# Patient Record
Sex: FEMALE | Race: WHITE | Employment: PART TIME | URBAN - METROPOLITAN AREA
[De-identification: names, ages, dates, MRNs, and addresses within clinical notes are randomized per-mention and may not be internally consistent; named-entity substitution may affect disease eponyms.]

---

## 2017-08-07 ENCOUNTER — OFFICE VISIT (OUTPATIENT)
Dept: INTERNAL MEDICINE CLINIC | Age: 52
End: 2017-08-07

## 2017-08-07 VITALS
SYSTOLIC BLOOD PRESSURE: 110 MMHG | TEMPERATURE: 98.6 F | BODY MASS INDEX: 30.11 KG/M2 | WEIGHT: 163.6 LBS | OXYGEN SATURATION: 98 % | DIASTOLIC BLOOD PRESSURE: 66 MMHG | HEIGHT: 62 IN | HEART RATE: 63 BPM | RESPIRATION RATE: 12 BRPM

## 2017-08-07 DIAGNOSIS — M25.551 ACUTE RIGHT HIP PAIN: Primary | ICD-10-CM

## 2017-08-07 RX ORDER — OMEGA-3S/DHA/EPA/FISH OIL/D3 300MG-1000
CAPSULE ORAL
COMMUNITY
End: 2019-12-19 | Stop reason: ALTCHOICE

## 2017-08-07 RX ORDER — PREDNISONE 20 MG/1
TABLET ORAL
Qty: 23 TAB | Refills: 0 | Status: SHIPPED | OUTPATIENT
Start: 2017-08-07 | End: 2017-09-15 | Stop reason: ALTCHOICE

## 2017-08-07 NOTE — MR AVS SNAPSHOT
Visit Information Date & Time Provider Department Dept. Phone Encounter #  
 8/7/2017  3:00 PM Kathrine Brewer MD Internist of [address] [id number] Upcoming Health Maintenance Date Due  
 PAP AKA CERVICAL CYTOLOGY 7/8/2017 INFLUENZA AGE 9 TO ADULT 8/1/2017 BREAST CANCER SCRN MAMMOGRAM 7/28/2018 COLONOSCOPY 12/5/2019 DTaP/Tdap/Td series (2 - Td) 4/4/2023 Allergies as of 8/7/2017  Review Complete On: 8/7/2017 By: Omar Dee LPN Severity Noted Reaction Type Reactions Penicillin G  10/04/2011    Unknown (comments) Able to take amoxicillin Current Immunizations  Reviewed on 11/3/2015 Name Date Td 1/1/2002 Tdap 4/4/2013 Not reviewed this visit You Were Diagnosed With   
  
 Codes Comments Acute right hip pain    -  Primary ICD-10-CM: M25.551 ICD-9-CM: 719.45 Vitals BP Pulse Temp Resp Height(growth percentile) Weight(growth percentile) 110/66 63 98.6 °F (37 °C) (Oral) 12 5' 2\" (1.575 m) 163 lb 9.6 oz (74.2 kg) SpO2 BMI OB Status Smoking Status 98% 29.92 kg/m2 Menopause Never Smoker Vitals History BMI and BSA Data Body Mass Index Body Surface Area  
 29.92 kg/m 2 1.8 m 2 Preferred Pharm[facility] Name Phone [facility]. Vanita Jason, [address] [facility] [phone] Your Updated Medication List  
  
   
This list is accurate as of: 8/7/17  3:31 PM.  Always use your most recent med list.  
  
  
  
  
 CALCIUM 500 PO Take  by mouth. omeprazole 40 mg capsule Commonly known as:  PriLOSEC Take 1 Cap by mouth daily. predniSONE 20 mg tablet Commonly known as:  DELTASONE  
3 tablets by mouth daily for 3 days, then 2 tabs looks daily VITAMIN D3 2,000 unit Tab Generic drug:  cholecalciferol (vitamin D3) Take  by mouth. Prescriptions Printed  Refills  
 predniSONE (DELTASONE) 20 mg tablet 0  
 Sig: 3 tablets by mouth daily for 3 days, then 2 tabs looks daily Class: Print Introducing [address]! [facility] introduces Voxa patient portal. Now you can access parts of your medical record, email your doctor's office, and request medication refills online. 1. In your internet browser, go to [email or web address] 2. Click on the First Time User? Click Here link in the Sign In box. You will see the New Member Sign Up page. 3. Enter your Voxa Access Code exactly as it appears below. You will not need to use this code after youve completed the sign-up process. If you do not sign up before the expiration date, you must request a new code. · Voxa Access Code: [email or web address] Expires: 11/5/2017  2:45 PM 
 
4. Enter the last four digits of your Social Security Number (xxxx) and Date of Birth (mm/dd/yyyy) as indicated and click Submit. You will be taken to the next sign-up page. 5. Create a Voxa ID. This will be your Voxa login ID and cannot be changed, so think of one that is secure and easy to remember. 6. Create a Voxa password. You can change your password at any time. 7. Enter your Password Reset Question and Answer. This can be used at a later time if you forget your password. 8. Enter your e-mail address. You will receive e-mail notification when new information is available in [address] 9. Click Sign Up. You can now view and download portions of your medical record. 10. Click the Download Summary menu link to download a portable copy of your medical information. If you have questions, please visit the Frequently Asked Questions section of the Voxa website. Remember, Voxa is NOT to be used for urgent needs. For medical emergencies, dial 911. Now available from your iPhone and Android! Please provide this summary of care documentation to your next provider. Your primary care clinician is listed as Raquel Pierre. Cherise Recinos. If you have any questions after today's visit, please call [phone].

## 2017-08-07 NOTE — PROGRESS NOTES
Elvis Ricci [facility] 1965, is a 46 y.o. female, who is seen today for right leg and hip pain. About a week ago she noted some vague discomfort in the pretibial region of her right leg but not a lot of pain and then after work 3 days ago she noted much more discomfort in the area of the right inguinal region and yesterday stayed in bed all day even though it hurt and last night she did not sleep well because of pain. It felt better in the leg if she actually rolled over onto her right hip to sleep and standing seems to be a little better than sitting. There is no pain in the back. She has never had anything like this before. She works as a nurse. No past medical history on file. Current Outpatient Prescriptions   Medication Sig Dispense Refill    cholecalciferol, vitamin D3, (VITAMIN D3) 2,000 unit tab Take  by mouth.  CALCIUM CARBONATE (CALCIUM 500 PO) Take  by mouth.  omeprazole (PRILOSEC) 40 mg capsule Take 1 Cap by mouth daily. 90 Cap 3     Allergies   Allergen Reactions    Penicillin G Unknown (comments)     Able to take amoxicillin     Visit Vitals    /66    Pulse 63    Temp 98.6 °F (37 °C) (Oral)    Resp 12    Ht 5' 2\" (1.575 m)    Wt 163 lb 9.6 oz (74.2 kg)    SpO2 98%    BMI 29.92 kg/m2     There is no tenderness in the lower leg or in the upper leg except there is some tenderness in the inguinal region. There is no lower abdominal tenderness. There is some mild tenderness over the lateral right hip. There is no edema in the lower leg. Dorsalis pedis and posterior tibialis pulses are 2+ on the right and left. She has normal straight leg raising. Forward flexion does not cause pain all the way to about 80° of flexion. She does have discomfort with internal and external rotation which she feels in the right inguinal region. There is no tenderness in the back. Deep tendon reflexes are equal bilaterally.     Assessment: Initially some discomfort in the anterior lower right leg which is minimal at this point but she is having a lot of pain in the inguinal region and standing is definitely not worse and perhaps a little better than sitting, but she has significant discomfort with even a small amount of internal and external rotation, suggesting this is coming from the hip and not the connective tissue surrounding the hip. She will be treated with prednisone 60 mg daily for 3 days then 40 mg daily thereafter until the pain is substantially better and if she is not significantly better in 3 days I would send her to an orthopedist.  I did explain to her the possible side effects of short-term prednisone since she does not recall if she has ever been on it and if she has it has been many years. Since she does not have a lot of pain standing and she does have a lot of pain sitting in bed, osteonecrosis does not seem to be the problem. Follow-up with me otherwise as needed and for her routine annual physicals. Emeterio Mcdaniel Se, MD FACP    Please note: This document has been produced using voice recognition software. Unrecognized errors in transcription may be present.

## 2017-09-15 ENCOUNTER — OFFICE VISIT (OUTPATIENT)
Dept: INTERNAL MEDICINE CLINIC | Age: 52
End: 2017-09-15

## 2017-09-15 VITALS
TEMPERATURE: 101.6 F | BODY MASS INDEX: 29.81 KG/M2 | HEART RATE: 109 BPM | WEIGHT: 162 LBS | DIASTOLIC BLOOD PRESSURE: 70 MMHG | SYSTOLIC BLOOD PRESSURE: 116 MMHG | OXYGEN SATURATION: 96 % | HEIGHT: 62 IN | RESPIRATION RATE: 12 BRPM

## 2017-09-15 DIAGNOSIS — J18.9 ATYPICAL PNEUMONIA: Primary | ICD-10-CM

## 2017-09-15 DIAGNOSIS — J02.9 PHARYNGITIS, UNSPECIFIED ETIOLOGY: ICD-10-CM

## 2017-09-15 LAB
S PYO AG THROAT QL: NEGATIVE
VALID INTERNAL CONTROL?: YES

## 2017-09-15 RX ORDER — AZITHROMYCIN 250 MG/1
TABLET, FILM COATED ORAL
Qty: 6 TAB | Refills: 0 | Status: SHIPPED | OUTPATIENT
Start: 2017-09-15 | End: 2017-09-20

## 2017-09-15 NOTE — PROGRESS NOTES
Elvis Ricci [facility] 1965, is a 46 y.o. female, who is seen today for upper respiratory symptoms. 2 days ago she developed a sore throat and then very soon thereafter some nasal congestion which is better with use of Sudafed. She also developed a cough with occasional production of clear foamy sputum. Her chest is sore from coughing day and night. Yesterday she was coughing so much he vomited once. She has had some chills but has not checked her temperature at home. She thinks she has a fever. She has had similar issues in the past with viral respiratory infections but generally not a fever. History reviewed. No pertinent past medical history. Current Outpatient Prescriptions   Medication Sig Dispense Refill    Mv,Ca,Min-FA-Herbal No.157 (ESTROVEN MAXIMUM STRENGTH) 400 mcg tab Take  by mouth.  MV,IRON,MIN/LUTEIN (CENTRUM SILVER ULTRA WOMEN'S PO) Take  by mouth.  cholecalciferol, vitamin D3, (VITAMIN D3) 2,000 unit tab Take  by mouth.  CALCIUM CARBONATE (CALCIUM 500 PO) Take  by mouth.  omeprazole (PRILOSEC) 40 mg capsule Take 1 Cap by mouth daily. 90 Cap 3     Visit Vitals    /70    Pulse (!) 109    Temp (!) 101.6 °F (38.7 °C) (Oral)    Resp 12    Ht 5' 2\" (1.575 m)    Wt 162 lb (73.5 kg)    SpO2 96%    BMI 29.63 kg/m2     Pharynx is very red without exudates. Sinuses are nontender. Neck reveals no adenopathy or tenderness. Lungs are clear to percussion. Good breath sounds with no wheezing or crackles. Heart reveals a regular rhythm with no murmur gallop click or rub. Abdomen is soft and nontender with no hepatosplenomegaly or masses and no bruits. Extremities reveal no clubbing cyanosis or edema. Strep screen is negative. Assessment: Upper respiratory symptoms as above with a lot of coughing and fever, most likely representing atypical pneumonia. I told her viruses other than influenza and sinus infections generally do not cause fever. She will be treated with azithromycin and she will use Robitussin-DM, she prefers not to use Tussionex. She will be going home and going to bed, she will keep up with fluids and call if there is any worsening of symptoms. Follow-up for physical in November or December. Emeterio Mcgrath MD FACP    Please note: This document has been produced using voice recognition software. Unrecognized errors in transcription may be present.

## 2017-09-15 NOTE — MR AVS SNAPSHOT
Visit Information Date & Time Provider Department Dept. Phone Encounter #  
 9/15/2017  8:30 AM Shauna Shay MD Internist of [facility] [address] [id number] Your Appointments 11/16/2017  9:25 AM  
LAB with [facility] NURSE VISIT Internist of [facility] (Anmol Beltran) Appt Note: lab  
 [address], [address] [facility] [address] [place]  
  
   
 [address], [address]  
  
    
 11/22/2017  9:15 AM  
PHYSICAL with Shauna Shay MD  
Internist of Adama Beltran) Appt Note: rpe  
 [address] [facility], [address] [address] [address] [place]  
  
   
 [address], [address] Upcoming Health Maintenance Date Due  
 PAP AKA CERVICAL CYTOLOGY 7/8/2017 INFLUENZA AGE 9 TO ADULT 8/1/2017 BREAST CANCER SCRN MAMMOGRAM 7/28/2018 COLONOSCOPY 12/5/2019 DTaP/Tdap/Td series (2 - Td) 4/4/2023 Allergies as of 9/15/2017  Review Complete On: 9/15/2017 By: Shauna Shay MD  
  
 Severity Noted Reaction Type Reactions Penicillin G  10/04/2011    Unknown (comments) Able to take amoxicillin Current Immunizations  Reviewed on 11/3/2015 Name Date Td 1/1/2002 Tdap 4/4/2013 Not reviewed this visit You Were Diagnosed With   
  
 Codes Comments Atypical pneumonia    -  Primary ICD-10-CM: J18.9 ICD-9-CM: 520 Pharyngitis, unspecified etiology     ICD-10-CM: J02.9 ICD-9-CM: 736 Vitals BP Pulse Temp Resp Height(growth percentile) Weight(growth percentile) 116/70 (!) 109 (!) 101.6 °F (38.7 °C) (Oral) 12 5' 2\" (1.575 m) 162 lb (73.5 kg) SpO2 BMI OB Status Smoking Status 96% 29.63 kg/m2 Menopause Never Smoker BMI and BSA Data Body Mass Index Body Surface Area  
 29.63 kg/m 2 1.79 m 2 [facility]erred Pharm[facility]. Grover Olivas, [address] [facility]. [phone] Your Updated Medication List  
  
   
This list is accurate as of: 9/15/17  9:11 AM.  Always use your most recent med list.  
  
  
  
  
 azithromycin 250 mg tablet Commonly known as:  Luciano Marshal 2 tablets by mouth today, then 1 daily CALCIUM 500 PO Take  by mouth. CENTRUM SILVER ULTRA WOMEN'S PO Take  by mouth. ESTROVEN MAXIMUM STRENGTH 400 mcg Tab Generic drug:  Mv,Ca,Min-FA-Herbal No.157 Take  by mouth. omeprazole 40 mg capsule Commonly known as:  PriLOSEC Take 1 Cap by mouth daily. VITAMIN D3 2,000 unit Tab Generic drug:  cholecalciferol (vitamin D3) Take  by mouth. Prescriptions Printed Refills  
 azithromycin (ZITHROMAX) 250 mg tablet 0 Si tablets by mouth today, then 1 daily Class: Print We Performed the Following AMB POC RAPID STREP A [63134 CPT(R)] Introducing [facility]! Laura Florian introduces NaHere patient portal. Now you can access parts of your medical record, email your doctor's office, and request medication refills online. 1. In your internet browser, go to [email or web address] 2. Click on the First Time User? Click Here link in the Sign In box. You will see the New Member Sign Up page. 3. Enter your NaHere Access Code exactly as it appears below. You will not need to use this code after youve completed the sign-up process. If you do not sign up before the expiration date, you must request a new code. · NaHere Access Code: [email or web address] Expires: 2017  2:45 PM 
 
4. Enter the last four digits of your Social Security Number (xxxx) and Date of Birth (mm/dd/yyyy) as indicated and click Submit. You will be taken to the next sign-up page. 5. Create a NaHere ID. This will be your NaHere login ID and cannot be changed, so think of one that is secure and easy to remember. 6. Create a Sunfun Info password. You can change your password at any time. 7. Enter your Password Reset Question and Answer. This can be used at a later time if you forget your password. 8. Enter your e-mail address. You will receive e-mail notification when new information is available in [address] 9. Click Sign Up. You can now view and download portions of your medical record. 10. Click the Download Summary menu link to download a portable copy of your medical information. If you have questions, please visit the Frequently Asked Questions section of the Sun[employer] Info website. Remember, Sun[employer] Info is NOT to be used for urgent needs. For medical emergencies, dial 911. Now available from your iPhone and Android! Please provide this summary of care documentation to your next provider. Your primary care clinician is listed as Uri Bedolla. Laurie Edwards. If you have any questions after today's visit, please call [phone].

## 2017-11-10 DIAGNOSIS — R63.5 WEIGHT GAIN: ICD-10-CM

## 2017-11-10 DIAGNOSIS — E55.9 VITAMIN D INSUFFICIENCY: ICD-10-CM

## 2017-11-10 DIAGNOSIS — Z00.00 ROUTINE GENERAL MEDICAL EXAMINATION AT A HEALTH CARE FACILITY: ICD-10-CM

## 2017-11-16 ENCOUNTER — HOSPITAL ENCOUNTER (OUTPATIENT)
Dept: LAB | Age: 52
Discharge: HOME | End: 2017-11-16
Payer: COMMERCIAL

## 2017-11-16 LAB
ALBUMIN SERPL-MCNC: 4 G/DL (ref 3.4–5)
ALBUMIN/GLOB SERPL: 1.1 {RATIO} (ref 0.8–1.7)
ALP SERPL-CCNC: 119 U/L (ref 45–117)
ALT SERPL-CCNC: 29 U/L (ref 13–56)
ANION GAP SERPL CALC-SCNC: 8 MMOL/L (ref 3–18)
APPEARANCE UR: CLEAR
AST SERPL-CCNC: 19 U/L (ref 15–37)
BACTERIA URNS QL MICRO: NEGATIVE /HPF
BASOPHILS # BLD: 0 K/UL (ref 0–0.06)
BASOPHILS NFR BLD: 0 % (ref 0–2)
BILIRUB SERPL-MCNC: 0.5 MG/DL (ref 0.2–1)
BILIRUB UR QL: NEGATIVE
BUN SERPL-MCNC: 11 MG/DL (ref 7–18)
BUN/CREAT SERPL: 15 (ref 12–20)
CALCIUM SERPL-MCNC: 9.7 MG/DL (ref 8.5–10.1)
CHLORIDE SERPL-SCNC: 101 MMOL/L (ref 100–108)
CHOLEST SERPL-MCNC: 201 MG/DL
CO2 SERPL-SCNC: 29 MMOL/L (ref 21–32)
COLOR UR: YELLOW
CREAT SERPL-MCNC: 0.72 MG/DL (ref 0.6–1.3)
DIFFERENTIAL METHOD BLD: NORMAL
EOSINOPHIL # BLD: 0 K/UL (ref 0–0.4)
EOSINOPHIL NFR BLD: 0 % (ref 0–5)
EPITH CASTS URNS QL MICRO: NORMAL /LPF (ref 0–5)
ERYTHROCYTE [DISTWIDTH] IN BLOOD BY AUTOMATED COUNT: 14.3 % (ref 11.6–14.5)
GLOBULIN SER CALC-MCNC: 3.6 G/DL (ref 2–4)
GLUCOSE SERPL-MCNC: 99 MG/DL (ref 74–99)
GLUCOSE UR STRIP.AUTO-MCNC: NEGATIVE MG/DL
HCT VFR BLD AUTO: 43.7 % (ref 35–45)
HDLC SERPL-MCNC: 40 MG/DL (ref 40–60)
HDLC SERPL: 5 {RATIO} (ref 0–5)
HGB BLD-MCNC: 14 G/DL (ref 12–16)
HGB UR QL STRIP: NEGATIVE
KETONES UR QL STRIP.AUTO: NEGATIVE MG/DL
LDLC SERPL CALC-MCNC: 131.4 MG/DL (ref 0–100)
LEUKOCYTE ESTERASE UR QL STRIP.AUTO: ABNORMAL
LIPID PROFILE,FLP: ABNORMAL
LYMPHOCYTES # BLD: 2.5 K/UL (ref 0.9–3.6)
LYMPHOCYTES NFR BLD: 36 % (ref 21–52)
MCH RBC QN AUTO: 29.5 PG (ref 24–34)
MCHC RBC AUTO-ENTMCNC: 32 G/DL (ref 31–37)
MCV RBC AUTO: 92 FL (ref 74–97)
MONOCYTES # BLD: 0.5 K/UL (ref 0.05–1.2)
MONOCYTES NFR BLD: 8 % (ref 3–10)
NEUTS SEG # BLD: 3.8 K/UL (ref 1.8–8)
NEUTS SEG NFR BLD: 56 % (ref 40–73)
NITRITE UR QL STRIP.AUTO: NEGATIVE
PH UR STRIP: 7 [PH] (ref 5–8)
PLATELET # BLD AUTO: 375 K/UL (ref 135–420)
PMV BLD AUTO: 11 FL (ref 9.2–11.8)
POTASSIUM SERPL-SCNC: 4.5 MMOL/L (ref 3.5–5.5)
PROT SERPL-MCNC: 7.6 G/DL (ref 6.4–8.2)
PROT UR STRIP-MCNC: NEGATIVE MG/DL
RBC # BLD AUTO: 4.75 M/UL (ref 4.2–5.3)
RBC #/AREA URNS HPF: 0 /HPF (ref 0–5)
SODIUM SERPL-SCNC: 138 MMOL/L (ref 136–145)
SP GR UR REFRACTOMETRY: 1.02 (ref 1–1.03)
T4 FREE SERPL-MCNC: 1 NG/DL (ref 0.7–1.5)
TRIGL SERPL-MCNC: 148 MG/DL (ref ?–150)
UROBILINOGEN UR QL STRIP.AUTO: 0.2 EU/DL (ref 0.2–1)
VLDLC SERPL CALC-MCNC: 29.6 MG/DL
WBC # BLD AUTO: 6.8 K/UL (ref 4.6–13.2)
WBC URNS QL MICRO: NORMAL /HPF (ref 0–4)

## 2017-11-16 PROCEDURE — 85025 COMPLETE CBC W/AUTO DIFF WBC: CPT | Performed by: INTERNAL MEDICINE

## 2017-11-16 PROCEDURE — 81001 URINALYSIS AUTO W/SCOPE: CPT | Performed by: INTERNAL MEDICINE

## 2017-11-16 PROCEDURE — 80061 LIPID PANEL: CPT | Performed by: INTERNAL MEDICINE

## 2017-11-16 PROCEDURE — 84439 ASSAY OF FREE THYROXINE: CPT | Performed by: INTERNAL MEDICINE

## 2017-11-16 PROCEDURE — 82306 VITAMIN D 25 HYDROXY: CPT | Performed by: INTERNAL MEDICINE

## 2017-11-16 PROCEDURE — 36415 COLL VENOUS BLD VENIPUNCTURE: CPT | Performed by: INTERNAL MEDICINE

## 2017-11-16 PROCEDURE — 80053 COMPREHEN METABOLIC PANEL: CPT | Performed by: INTERNAL MEDICINE

## 2017-11-17 LAB — 25(OH)D3 SERPL-MCNC: 37.3 NG/ML (ref 30–100)

## 2017-11-22 ENCOUNTER — OFFICE VISIT (OUTPATIENT)
Dept: INTERNAL MEDICINE CLINIC | Age: 52
End: 2017-11-22

## 2017-11-22 VITALS
HEIGHT: 62 IN | OXYGEN SATURATION: 99 % | SYSTOLIC BLOOD PRESSURE: 118 MMHG | RESPIRATION RATE: 14 BRPM | BODY MASS INDEX: 28.93 KG/M2 | DIASTOLIC BLOOD PRESSURE: 82 MMHG | TEMPERATURE: 98 F | WEIGHT: 157.2 LBS | HEART RATE: 102 BPM

## 2017-11-22 DIAGNOSIS — Z00.00 ROUTINE GENERAL MEDICAL EXAMINATION AT A HEALTH CARE FACILITY: Primary | ICD-10-CM

## 2017-11-22 RX ORDER — OMEPRAZOLE 40 MG/1
40 CAPSULE, DELAYED RELEASE ORAL DAILY
Qty: 90 CAP | Refills: 3 | Status: SHIPPED | OUTPATIENT
Start: 2017-11-22

## 2017-11-22 NOTE — PROGRESS NOTES
1. Have you been to the ER, urgent care clinic or hospitalized since your last visit? NO.     2. Have you seen or consulted any other health care providers outside of the [address] [place] [address] since your last visit (Include any pap smears or colon screening)? [facility] Dr Adrian Kaur She has an appt Dec 1st for eye exam with Dr Glenis [facility]     Do you have an Advanced Directive? NO    Would you like information on Advanced Directives?  YES

## 2017-11-22 NOTE — PATIENT INSTRUCTIONS
Advance Directives: Care Instructions  Your Care Instructions  An advance directive is a legal way to state your wishes at the end of your life. It tells your family and your doctor what to do if you can no longer say what you want. There are two main types of advance directives. You can change them any time that your wishes change. · A living will tells your family and your doctor your wishes about life support and other treatment. · A durable power of  for health care lets you name a person to make treatment decisions for you when you can't speak for yourself. This person is called a health care agent. If you do not have an advance directive, decisions about your medical care may be made by a doctor or a  who doesn't know you. It may help to think of an advance directive as a gift to the people who care for you. If you have one, they won't have to make tough decisions by themselves. Follow-up care is a key part of your treatment and safety. Be sure to make and go to all appointments, and call your doctor if you are having problems. It's also a good idea to know your test results and keep a list of the medicines you take. How can you care for yourself at home? · Discuss your wishes with your loved ones and your doctor. This way, there are no surprises. · Many states have a unique form. Or you might use a universal form that has been approved by many states. This kind of form can sometimes be completed and stored online. Your electronic copy will then be available wherever you have a connection to the Internet. In most cases, doctors will respect your wishes even if you have a form from a different state. · You don't need a  to do an advance directive. But you may want to get legal advice. · Think about these questions when you prepare an advance directive:  ¨ Who do you want to make decisions about your medical care if you are not able to?  Many people choose a family member or close friend. ¨ Do you know enough about life support methods that might be used? If not, talk to your doctor so you understand. ¨ What are you most afraid of that might happen? You might be afraid of having pain, losing your independence, or being kept alive by machines. ¨ Where would you prefer to die? Choices include your home, a hospital, or a nursing home. ¨ Would you like to have information about hospice care to support you and your family? ¨ Do you want to donate organs when you die? ¨ Do you want certain Confucianism practices performed before you die? If so, put your wishes in the advance directive. · Read your advance directive every year, and make changes as needed. When should you call for help? Be sure to contact your doctor if you have any questions. Where can you learn more? Go to http://isaías-ashanti.info/. Enter R264 in the search box to learn more about \"Advance Directives: Care Instructions. \"  Current as of: September 24, 2016  Content Version: 11.4  © 8047-4633 Healthwise, Incorporated. Care instructions adapted under license by [employer] (which disclaims liability or warranty for this information). If you have questions about a medical condition or this instruction, always ask your healthcare professional. [address] any warranty or liability for your use of this information.

## 2017-11-22 NOTE — PROGRESS NOTES
Salvador [facility] 1965, is a 45 y.o. female, who is seen today for routine physical exam.  She feels well. She has cut out breakfast a lot of days and most of her sodium has lost 7 pounds in the last year and she would like to get down to 150 pounds. She has hypovitaminosis D and does use vitamin D every day. She has a history of reflux and is asymptomatic on omeprazole. She has not had a menstrual period in nearly 2 years and she is due to see her gynecologist soon, her last Pap smear was about 2 years ago. She does get mammograms every year. She checks her own breasts. She is a nurse. History reviewed. No pertinent past medical history. Past Surgical History:   Procedure Laterality Date    HX COLONOSCOPY  12/05/2014    Tubular adenoma, 5 years    HX ORTHOPAEDIC      fxd wrist     Current Outpatient Prescriptions   Medication Sig Dispense Refill    omeprazole (PRILOSEC) 40 mg capsule Take 1 Cap by mouth daily. 90 Cap 3    Mv,Ca,Min-FA-Herbal No.157 (ESTROVEN MAXIMUM STRENGTH) 400 mcg tab Take  by mouth.  MV,IRON,MIN/LUTEIN (CENTRUM SILVER ULTRA WOMEN'S PO) Take  by mouth.  cholecalciferol, vitamin D3, (VITAMIN D3) 2,000 unit tab Take  by mouth.  CALCIUM CARBONATE (CALCIUM 500 PO) Take  by mouth.        Allergies   Allergen Reactions    Penicillin G Unknown (comments)     Able to take amoxicillin     Social History     Social History    Marital status:      Spouse name: N/A    Number of children: N/A    Years of education: N/A     Social History Main Topics    Smoking status: Never Smoker    Smokeless tobacco: Never Used    Alcohol use No    Drug use: No    Sexual activity: Yes     Partners: Male     Birth control/ protection: None     Other Topics Concern    None     Social History Narrative     Visit Vitals    /82 (BP 1 Location: Right arm, BP Patient Position: Sitting)    Pulse (!) 102    Temp 98 °F (36.7 °C) (Oral)    Resp 14    Ht 5' 2\" (1.575 m)    Wt 157 lb 3.2 oz (71.3 kg)    SpO2 99%    BMI 28.75 kg/m2     The patient is a well-developed well-nourished female in no apparent distress. HEENT: Pupils are equal and react to light and extraocular movements are full. Ear canals and tympanic membranes appear normal. Oral cavity appears normal with no oral lesions. Neck: Carotids are 2+ without bruits. No adenopathy or thyromegaly. Lungs are clear to percussion. I hear no wheezing, rales or rhonchi. Heart reveals a regular rhythm with no murmur, gallop, click or rub. The apical impulse is in the fifth interspace at the midclavicular line. Abdomen is soft and nontender with no hepatosplenomegaly or masses. Bowel sounds are normoactive and there is no distention or tympany. Extremities reveal no clubbing cyanosis or edema. Pulses are 2+. Skin reveals no suspicious skin growths. Breasts reveal no masses, skin or nipple abnormalities. No axillary adenopathy. Results for orders placed or performed during the hospital encounter of 11/16/17   CBC WITH AUTOMATED DIFF   Result Value Ref Range    WBC 6.8 4.6 - 13.2 K/uL    RBC 4.75 4.20 - 5.30 M/uL    HGB 14.0 12.0 - 16.0 g/dL    HCT 43.7 35.0 - 45.0 %    MCV 92.0 74.0 - 97.0 FL    MCH 29.5 24.0 - 34.0 PG    MCHC 32.0 31.0 - 37.0 g/dL    RDW 14.3 11.6 - 14.5 %    PLATELET 016 176 - 740 K/uL    MPV 11.0 9.2 - 11.8 FL    NEUTROPHILS 56 40 - 73 %    LYMPHOCYTES 36 21 - 52 %    MONOCYTES 8 3 - 10 %    EOSINOPHILS 0 0 - 5 %    BASOPHILS 0 0 - 2 %    ABS. NEUTROPHILS 3.8 1.8 - 8.0 K/UL    ABS. LYMPHOCYTES 2.5 0.9 - 3.6 K/UL    ABS. MONOCYTES 0.5 0.05 - 1.2 K/UL    ABS. EOSINOPHILS 0.0 0.0 - 0.4 K/UL    ABS.  BASOPHILS 0.0 0.0 - 0.06 K/UL    DF AUTOMATED     T4, FREE   Result Value Ref Range    T4, Free 1.0 0.7 - 1.5 NG/DL   LIPID PANEL   Result Value Ref Range    LIPID PROFILE          Cholesterol, total 201 (H) <200 MG/DL    Triglyceride 148 <150 MG/DL    HDL Cholesterol 40 40 - 60 MG/DL    LDL, calculated 131.4 (H) 0 - 100 MG/DL    VLDL, calculated 29.6 MG/DL    CHOL/HDL Ratio 5.0 0 - 5.0     METABOLIC PANEL, COMPREHENSIVE   Result Value Ref Range    Sodium 138 136 - 145 mmol/L    Potassium 4.5 3.5 - 5.5 mmol/L    Chloride 101 100 - 108 mmol/L    CO2 29 21 - 32 mmol/L    Anion gap 8 3.0 - 18 mmol/L    Glucose 99 74 - 99 mg/dL    BUN 11 7.0 - 18 MG/DL    Creatinine 0.72 0.6 - 1.3 MG/DL    BUN/Creatinine ratio 15 12 - 20      GFR est AA >60 >60 ml/min/1.73m2    GFR est non-AA >60 >60 ml/min/1.73m2    Calcium 9.7 8.5 - 10.1 MG/DL    Bilirubin, total 0.5 0.2 - 1.0 MG/DL    ALT (SGPT) 29 13 - 56 U/L    AST (SGOT) 19 15 - 37 U/L    Alk. phosphatase 119 (H) 45 - 117 U/L    Protein, total 7.6 6.4 - 8.2 g/dL    Albumin 4.0 3.4 - 5.0 g/dL    Globulin 3.6 2.0 - 4.0 g/dL    A-G Ratio 1.1 0.8 - 1.7     URINALYSIS W/ RFLX MICROSCOPIC   Result Value Ref Range    Color YELLOW      Appearance CLEAR      Specific gravity 1.022 1.005 - 1.030      pH (UA) 7.0 5.0 - 8.0      Protein NEGATIVE  NEG mg/dL    Glucose NEGATIVE  NEG mg/dL    Ketone NEGATIVE  NEG mg/dL    Bilirubin NEGATIVE  NEG      Blood NEGATIVE  NEG      Urobilinogen 0.2 0.2 - 1.0 EU/dL    Nitrites NEGATIVE  NEG      Leukocyte Esterase TRACE (A) NEG     VITAMIN D, 25 HYDROXY   Result Value Ref Range    Vitamin D 25-Hydroxy 37.3 30 - 100 ng/mL   URINE MICROSCOPIC ONLY   Result Value Ref Range    WBC 2 to 3 0 - 4 /hpf    RBC 0 0 - 5 /hpf    Epithelial cells FEW 0 - 5 /lpf    Bacteria NEGATIVE  NEG /hpf     Assessment: #1. Overweight somewhat improved. She is going to continue working on weight loss and try to get down to at least 150 or less. #2. Hypovitaminosis D improved. She will continue vitamin D 2000 units daily along with calcium. #3.  Menopause with some hot flashes. She will follow-up with Dr. Timbo Cramer soon. I told her that generally Pap smears every 3 years is often enough. #4.  GERD asymptomatic. She will continue omeprazole 40 mg daily.     Follow-up in 1 year for physical or sooner if needed. She gets flu shots at work at [employer]. Nanette Royal MD FACP    Please note: This document has been produced using voice recognition software. Unrecognized errors in transcription may be present.

## 2017-11-22 NOTE — MR AVS SNAPSHOT
Visit Information Date & Time Provider Department Dept. Phone Encounter #  
 11/22/2017  9:15 AM Lorena Rossi MD [facility]  Your Appointments 11/16/2018  7:55 AM  
LAB with [facility] NURSE VISIT [facility] ([address]) Appt Note: labs [address], [facility] [address]  
  
   
 [address], [facility]  
  
    
 11/23/2018  9:15 AM  
PHYSICAL with Lorena Rossi MD  
[facility] [address]) Appt Note: 1 yr. physical  
 [address] [facility], [address] [facility] [address]  
  
   
 [address], [facility] Upcoming Health Maintenance Date Due  
 BREAST CANCER SCRN MAMMOGRAM 7/28/2018 PAP AKA CERVICAL CYTOLOGY 7/13/2019 COLONOSCOPY 12/5/2019 DTaP/Tdap/Td series (2 - Td) 4/4/2023 Allergies as of 11/22/2017  Review Complete On: 11/22/2017 By: Lorena Rossi MD  
  
 Severity Noted Reaction Type Reactions Penicillin G  10/04/2011    Unknown (comments) Able to take amoxicillin Current Immunizations  Reviewed on 11/3/2015 Name Date Td 1/1/2002 Tdap 4/4/2013 Not reviewed this visit You Were Diagnosed With   
  
 Codes Comments Routine general medical examination at a health care facility    -  Primary ICD-10-CM: Z00.00 ICD-9-CM: V70.0 Vitals BP Pulse Temp Resp Height(growth percentile) Weight(growth percentile) 118/82 (BP 1 Location: Right arm, BP Patient Position: Sitting) (!) 102 98 °F (36.7 °C) (Oral) 14 5' 2\" (1.575 m) 157 lb 3.2 oz (71.3 kg) SpO2 BMI OB Status Smoking Status 99% 28.75 kg/m2 Menopause Never Smoker Vitals History BMI and BSA Data Body Mass Index Body Surface Area 28.75 kg/m 2 1.77 m 2 Preferred Pharmacy Pharmacy Name Phone [facility]. Olivia Henson, [address] . [phone] Your Updated Medication List  
  
   
This list is accurate as of: 11/22/17 10:10 AM.  Always use your most recent med list.  
  
  
  
  
 CALCIUM 500 PO Take  by mouth. CENTRUM SILVER ULTRA WOMEN'S PO Take  by mouth. ESTROVEN MAXIMUM STRENGTH 400 mcg Tab Generic drug:  Mv,Ca,Min-FA-Herbal No.157 Take  by mouth. omeprazole 40 mg capsule Commonly known as:  PriLOSEC Take 1 Cap by mouth daily. VITAMIN D3 2,000 unit Tab Generic drug:  cholecalciferol (vitamin D3) Take  by mouth. Prescriptions Printed Refills  
 omeprazole (PRILOSEC) 40 mg capsule 3 Sig: Take 1 Cap by mouth daily. Class: Print Route: Oral  
  
Patient Instructions Advance Directives: Care Instructions Your Care Instructions An advance directive is a legal way to state your wishes at the end of your life. It tells your family and your doctor what to do if you can no longer say what you want. There are two main types of advance directives. You can change them any time that your wishes change. · A living will tells your family and your doctor your wishes about life support and other treatment. · A durable power of  for health care lets you name a person to make treatment decisions for you when you can't speak for yourself. This person is called a health care agent. If you do not have an advance directive, decisions about your medical care may be made by a doctor or a  who doesn't know you. It may help to think of an advance directive as a gift to the people who care for you. If you have one, they won't have to make tough decisions by themselves. Follow-up care is a key part of your treatment and safety. Be sure to make and go to all appointments, and call your doctor if you are having problems.  It's also a good idea to know your test results and keep a list of the medicines you take. How can you care for yourself at home? · Discuss your wishes with your loved ones and your doctor. This way, there are no surprises. · Many states have a unique form. Or you might use a universal form that has been approved by many states. This kind of form can sometimes be completed and stored online. Your electronic copy will then be available wherever you have a connection to the Internet. In most cases, doctors will respect your wishes even if you have a form from a different state. · You don't need a  to do an advance directive. But you may want to get legal advice. · Think about these questions when you prepare an advance directive: ¨ Who do you want to make decisions about your medical care if you are not able to? Many people choose a family member or close friend. ¨ Do you know enough about life support methods that might be used? If not, talk to your doctor so you understand. ¨ What are you most afraid of that might happen? You might be afraid of having pain, losing your independence, or being kept alive by machines. ¨ Where would you prefer to die? Choices include your home, a hospital, or a nursing home. ¨ Would you like to have information about hospice care to support you and your family? ¨ Do you want to donate organs when you die? ¨ Do you want certain Yarsani practices performed before you die? If so, put your wishes in the advance directive. · Read your advance directive every year, and make changes as needed. When should you call for help? Be sure to contact your doctor if you have any questions. Where can you learn more? Go to http://isaaís-ashanti.info/. Enter R264 in the search box to learn more about \"Advance Directives: Care Instructions. \" Current as of: September 24, 2016 Content Version: 11.4 © 4675-6367 Healthwise, Incorporated.  Care instructions adapted under license by [address] (which disclaims liability or warranty for this information). If you have questions about a medical condition or this instruction, always ask your healthcare professional. [address] any warranty or liability for your use of this information. Introducing [facility] & HEALTH SERVICES! Gwendolyn Coughlingary introduces [employer] patient portal. Now you can access parts of your medical record, email your doctor's office, and request medication refills online. 1. In your internet browser, go to [email or web address] [email or web address] by Peak/Visual IQ 2. Click on the First Time User? Click Here link in the Sign In box. You will see the New Member Sign Up page. 3. Enter your [employer] Access Code exactly as it appears below. You will not need to use this code after youve completed the sign-up process. If you do not sign up before the expiration date, you must request a new code. · [employer] Access Code: [email or web address] Expires: 2/20/2018  9:01 AM 
 
4. Enter the last four digits of your Social Security Number (xxxx) and Date of Birth (mm/dd/yyyy) as indicated and click Submit. You will be taken to the next sign-up page. 5. Create a [employer] ID. This will be your [employer] login ID and cannot be changed, so think of one that is secure and easy to remember. 6. Create a [employer] password. You can change your password at any time. 7. Enter your Password Reset Question and Answer. This can be used at a later time if you forget your password. 8. Enter your e-mail address. You will receive e-mail notification when new information is available in [address] 9. Click Sign Up. You can now view and download portions of your medical record. 10. Click the Download Summary menu link to download a portable copy of your medical information. If you have questions, please visit the Frequently Asked Questions section of the [employer] website.  Remember, [employer] is NOT to be used for urgent needs. For medical emergencies, dial 911. Now available from your iPhone and Android! Please provide this summary of care documentation to your next provider. Your primary care clinician is listed as Peggy Schultz. Darylene Mori. If you have any questions after today's visit, please call [phone].

## 2018-01-17 ENCOUNTER — TELEPHONE (OUTPATIENT)
Dept: INTERNAL MEDICINE CLINIC | Age: 53
End: 2018-01-17

## 2018-01-17 RX ORDER — OSELTAMIVIR PHOSPHATE 75 MG/1
CAPSULE ORAL
Qty: 10 CAP | Refills: 0 | Status: SHIPPED | OUTPATIENT
Start: 2018-01-17 | End: 2018-01-27

## 2018-01-17 NOTE — TELEPHONE ENCOUNTER
Pt's son was seen on 01/14/18 at [facility] for the flu, she is asking if she can get a rx for yamileth flu called into [facility] on file, RM

## 2018-01-29 ENCOUNTER — OFFICE VISIT (OUTPATIENT)
Dept: INTERNAL MEDICINE CLINIC | Age: 53
End: 2018-01-29

## 2018-01-29 VITALS
SYSTOLIC BLOOD PRESSURE: 126 MMHG | HEIGHT: 62 IN | DIASTOLIC BLOOD PRESSURE: 86 MMHG | OXYGEN SATURATION: 98 % | HEART RATE: 71 BPM | WEIGHT: 158.6 LBS | TEMPERATURE: 98.7 F | BODY MASS INDEX: 29.19 KG/M2 | RESPIRATION RATE: 16 BRPM

## 2018-01-29 DIAGNOSIS — M54.50 ACUTE LEFT-SIDED LOW BACK PAIN WITHOUT SCIATICA: Primary | ICD-10-CM

## 2018-01-29 NOTE — PROGRESS NOTES
Chief Complaint   Patient presents with    Back Pain     Patient present with lower left back pain. Onset 4 days ago. patient states she has used 800 mg of Motrin only one day on Saturday, but hasn't taken anything else for it. ROOM 8     1. Have you been to the ER, urgent care clinic or hospitalized since your last visit? NO.     2. Have you seen or consulted any other health care providers outside of the [address] [place] [address] since your last visit (Include any pap smears or colon screening)?  NO

## 2018-01-29 NOTE — PROGRESS NOTES
Kiersten Perez [facility] 1965, is a 45 y.o. female, who is seen today for a four-day history of lumbar pain mainly on the left. She had taken down her Lia tree 1 or 2 days before this developed but she does not recall straining her back. 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 It is still hurting today particularly when she moves in certain directions, taking laundry out of her washing machine etc.  No urinary symptoms and no fever. History reviewed. No pertinent past medical history. Current Outpatient Prescriptions   Medication Sig Dispense Refill    MV,IRON,MIN/LUTEIN (CENTRUM SILVER ULTRA WOMEN'S PO) Take  by mouth.  cholecalciferol, vitamin D3, (VITAMIN D3) 2,000 unit tab Take  by mouth.  CALCIUM CARBONATE (CALCIUM 500 PO) Take  by mouth.  omeprazole (PRILOSEC) 40 mg capsule Take 1 Cap by mouth daily. 90 Cap 3     Visit Vitals    /86 (BP 1 Location: Right arm, BP Patient Position: Sitting)    Pulse 71    Temp 98.7 °F (37.1 °C) (Oral)    Resp 16    Ht 5' 2\" (1.575 m)    Wt 158 lb 9.6 oz (71.9 kg)    SpO2 98%    BMI 29.01 kg/m2     There is no muscular tenderness anywhere. No CVA tenderness. Forward flexion is possible to about 70° before it starts to hurt and side to side movement is pretty good but she does have some pain just to the left of the lumbar spine. Was hurting on both sides 2 days ago after she worked all day. Assessment: Muscular back pain probably related to taking down her Lia tree. She will use ibuprofen as needed heat as needed and this should clear up on its own. Follow-up as previously planned    Elsa Del Rio. Shawn Gant MD FACP    Please note: This document has been produced using voice recognition software. Unrecognized errors in transcription may be present.

## 2018-01-29 NOTE — MR AVS SNAPSHOT
[address] [facility] 
 
 
 [address], [address] [place] [address] [facility] 
[phone] Patient: Mode Bates MRN: [id number] :1965 Visit Information Date & Time Provider Department Dept. Phone Encounter #  
 2018  3:00 PM Shanice Zuniga MD [facility] [address] [place] [address] [phone] Your Appointments 2018  7:55 AM  
LAB with [facility] NURSE VISIT [facility] [address] [place] [address] (Ciara Aguilera) Appt Note: labs [address], [address] [place] [address]  
  
   
 [address], [address]  
  
    
 2018  9:15 AM  
PHYSICAL with Shanice Zuniga MD  
[facility] [address] [place] [address] [facility]) Appt Note: 1 yr. physical  
 [address] [facility], [address]  
  
   
 [address], [address] Upcoming Health Maintenance Date Due  
 BREAST CANCER SCRN MAMMOGRAM 2018 PAP AKA CERVICAL CYTOLOGY 2019 COLONOSCOPY 2019 DTaP/Tdap/Td series (2 - Td) 2023 Allergies as of 2018  Review Complete On: 2018 By: Shanice Zuniga MD  
  
 Severity Noted Reaction Type Reactions Penicillin G  10/04/2011    Unknown (comments) Able to take amoxicillin Current Immunizations  Reviewed on 2017 Name Date Td 2002 Tdap 2013 Not reviewed this visit You Were Diagnosed With   
  
 Codes Comments Acute left-sided low back pain without sciatica    -  Primary ICD-10-CM: M54.5 ICD-9-CM: 724.2 Vitals BP Pulse Temp Resp Height(growth percentile) Weight(growth percentile) 126/86 (BP 1 Location: Right arm, BP Patient Position: Sitting) 71 98.7 °F (37.1 °C) (Oral) 16 5' 2\" (1.575 m) 158 lb 9.6 oz (71.9 kg) SpO2 BMI OB Status Smoking Status 98% 29.01 kg/m2 Menopause Never Smoker Vitals History BMI and BSA Data Body Mass Index Body Surface Area  
 29.01 kg/m 2 1.77 m 2 Preferred Pharmacy Pharm[facility] Name Phone [id number] [address], [address] [phone] Your Updated Medication List  
  
   
This list is accurate as of: 1/29/18  3:22 PM.  Always use your most recent med list.  
  
  
  
  
 CALCIUM 500 PO Take  by mouth. CENTRUM SILVER ULTRA WOMEN'S PO Take  by mouth. omeprazole 40 mg capsule Commonly known as:  PriLOSEC Take 1 Cap by mouth daily. VITAMIN D3 2,000 unit Tab Generic drug:  cholecalciferol (vitamin D3) Take  by mouth. Introducing [facility] & HEALTH SERVICES! Ellen Zurita introduces [employer] patient portal. Now you can access parts of your medical record, email your doctor's office, and request medication refills online. 1. In your internet browser, go to [email or web address] Feed 2. Click on the First Time User? Click Here link in the Sign In box. You will see the New Member Sign Up page. 3. Enter your [employer] Access Code exactly as it appears below. You will not need to use this code after youve completed the sign-up process. If you do not sign up before the expiration date, you must request a new code. · [employer] Access Code: [email or web address] Expires: 2/20/2018  9:01 AM 
 
4. Enter the last four digits of your Social Security Number (xxxx) and Date of Birth (mm/dd/yyyy) as indicated and click Submit. You will be taken to the next sign-up page. 5. Create a [employer] ID. This will be your [employer] login ID and cannot be changed, so think of one that is secure and easy to remember. 6. Create a [employer] password. You can change your password at any time. 7. Enter your Password Reset Question and Answer. This can be used at a later time if you forget your password. 8. Enter your e-mail address. You will receive e-mail notification when new information is available in [address] 9. Click Sign Up. You can now view and download portions of your medical record. 10. Click the Download Summary menu link to download a portable copy of your medical information. If you have questions, please visit the Frequently Asked Questions section of the [employer] website. Remember, [employer] is NOT to be used for urgent needs. For medical emergencies, dial 911. Now available from your iPhone and Android! Please provide this summary of care documentation to your next provider. Your primary care clinician is listed as Chuck Moore. Sandra Huang. If you have any questions after today's visit, please call [phone].

## 2018-05-17 ENCOUNTER — HOSPITAL ENCOUNTER (OUTPATIENT)
Dept: LAB | Age: 53
Discharge: HOME | End: 2018-05-17
Payer: COMMERCIAL

## 2018-05-17 ENCOUNTER — OFFICE VISIT (OUTPATIENT)
Dept: INTERNAL MEDICINE CLINIC | Age: 53
End: 2018-05-17

## 2018-05-17 VITALS
RESPIRATION RATE: 14 BRPM | TEMPERATURE: 99.1 F | HEART RATE: 99 BPM | WEIGHT: 155 LBS | SYSTOLIC BLOOD PRESSURE: 110 MMHG | BODY MASS INDEX: 28.52 KG/M2 | HEIGHT: 62 IN | OXYGEN SATURATION: 97 % | DIASTOLIC BLOOD PRESSURE: 60 MMHG

## 2018-05-17 DIAGNOSIS — N30.00 ACUTE CYSTITIS WITHOUT HEMATURIA: Primary | ICD-10-CM

## 2018-05-17 DIAGNOSIS — N30.00 ACUTE CYSTITIS WITHOUT HEMATURIA: ICD-10-CM

## 2018-05-17 PROCEDURE — 87086 URINE CULTURE/COLONY COUNT: CPT | Performed by: INTERNAL MEDICINE

## 2018-05-17 RX ORDER — SULFAMETHOXAZOLE AND TRIMETHOPRIM 800; 160 MG/1; MG/1
1 TABLET ORAL 2 TIMES DAILY
Qty: 10 TAB | Refills: 0 | Status: SHIPPED | OUTPATIENT
Start: 2018-05-17 | End: 2018-05-20

## 2018-05-17 RX ORDER — PHENAZOPYRIDINE HYDROCHLORIDE 200 MG/1
200 TABLET, FILM COATED ORAL
Qty: 6 TAB | Refills: 0 | Status: SHIPPED | OUTPATIENT
Start: 2018-05-17 | End: 2018-05-20

## 2018-05-17 NOTE — PROGRESS NOTES
45 y.o. WHITE OR  female who presents for evaluation. She's had uti sx for several days. Normally, she uses cranberry juice whenever she feels these sx but not working this time. Admits to urgency, dysuria, maybe even small amounts blood tinging of the urine. No flank pain but she does have some low back pain without radicular complaints. No f although admits to some nausea    Past Medical History:   Diagnosis Date    Colon adenoma 12/2014    Dr Odilon Cosme Gastroesophageal reflux disease without esophagitis 11/17/2016    Overweight (BMI 25.0-29. 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  phenazopyridine (PYRIDIUM) 200 mg tablet Take 1 Tab by mouth three (3) times daily as needed for Pain for up to 3 days.  omeprazole (PRILOSEC) 40 mg capsule Take 1 Cap by mouth daily.  MV,IRON,MIN/LUTEIN (CENTRUM SILVER ULTRA WOMEN'S PO) Take  by mouth.  CALCIUM CARBONATE (CALCIUM 500 PO) Take  by mouth.  cholecalciferol, vitamin D3, (VITAMIN D3) 2,000 unit tab Take  by mouth. No current facility-administered medications for this visit. Allergies   Allergen Reactions    Penicillin G Unknown (comments)     Able to take amoxicillin     Visit Vitals    /60 (BP 1 Location: Left arm, BP Patient Position: Sitting)    Pulse 99    Temp 99.1 °F (37.3 °C) (Oral)    Resp 14    Ht 5' 2\" (1.575 m)    Wt 155 lb (70.3 kg)    SpO2 97%    BMI 28.35 kg/m2   back showed no cvat, abd soft and nt, no hsm/masses    UA dipstick showed 2+ leuk, neg nitrate    Assessment and plan:  1. Probable uti. Empiric bactrim, prn pyridium, pending cultures. Call if no better        Above conditions discussed at length and patient vocalized understanding.   All questions answered to patient satisfaction

## 2018-05-17 NOTE — TELEPHONE ENCOUNTER
That's fine, obviously it was an error on Dr Marleny Freeman part, its already been dispensed to the patient

## 2018-05-17 NOTE — TELEPHONE ENCOUNTER
[facility] has called again- the script called for 10 pills but was written for #6- they are giving PT #10 but telling them to follow the directions so they should only be taking 6

## 2018-05-17 NOTE — PROGRESS NOTES
Chief Complaint   Patient presents with    Urinary Pain     Patient present for pain at urination and her lower back is starting to hurt. Onset 2 days ago. Patient states she drank cranberry juice. 1. Have you been to the ER, urgent care clinic or hospitalized since your last visit? NO.     2. Have you seen or consulted any other health care providers outside of the [address] [place] [address] since your last visit (Include any pap smears or colon screening)?  NO

## 2018-05-19 LAB
BACTERIA SPEC CULT: NORMAL
SERVICE CMNT-IMP: NORMAL

## 2018-11-16 ENCOUNTER — LAB ONLY (OUTPATIENT)
Dept: INTERNAL MEDICINE CLINIC | Age: 53
End: 2018-11-16

## 2018-11-16 ENCOUNTER — HOSPITAL ENCOUNTER (OUTPATIENT)
Dept: LAB | Age: 53
Discharge: HOME | End: 2018-11-16
Payer: COMMERCIAL

## 2018-11-16 DIAGNOSIS — R63.5 WEIGHT GAIN: ICD-10-CM

## 2018-11-16 DIAGNOSIS — R00.2 PALPITATIONS: ICD-10-CM

## 2018-11-16 DIAGNOSIS — Z00.00 ROUTINE GENERAL MEDICAL EXAMINATION AT A HEALTH CARE FACILITY: ICD-10-CM

## 2018-11-16 DIAGNOSIS — E55.9 VITAMIN D INSUFFICIENCY: ICD-10-CM

## 2018-11-16 DIAGNOSIS — Z00.00 ROUTINE GENERAL MEDICAL EXAMINATION AT A HEALTH CARE FACILITY: Primary | ICD-10-CM

## 2018-11-16 LAB
ALBUMIN SERPL-MCNC: 3.9 G/DL (ref 3.4–5)
ALBUMIN/GLOB SERPL: 1 {RATIO} (ref 0.8–1.7)
ALP SERPL-CCNC: 138 U/L (ref 45–117)
ALT SERPL-CCNC: 30 U/L (ref 13–56)
ANION GAP SERPL CALC-SCNC: 6 MMOL/L (ref 3–18)
APPEARANCE UR: CLEAR
AST SERPL-CCNC: 22 U/L (ref 15–37)
BASOPHILS # BLD: 0 K/UL (ref 0–0.1)
BASOPHILS NFR BLD: 0 % (ref 0–2)
BILIRUB SERPL-MCNC: 0.4 MG/DL (ref 0.2–1)
BILIRUB UR QL: NEGATIVE
BUN SERPL-MCNC: 11 MG/DL (ref 7–18)
BUN/CREAT SERPL: 15 (ref 12–20)
CALCIUM SERPL-MCNC: 9 MG/DL (ref 8.5–10.1)
CHLORIDE SERPL-SCNC: 103 MMOL/L (ref 100–108)
CHOLEST SERPL-MCNC: 212 MG/DL
CO2 SERPL-SCNC: 30 MMOL/L (ref 21–32)
COLOR UR: YELLOW
CREAT SERPL-MCNC: 0.72 MG/DL (ref 0.6–1.3)
DIFFERENTIAL METHOD BLD: ABNORMAL
EOSINOPHIL # BLD: 0.1 K/UL (ref 0–0.4)
EOSINOPHIL NFR BLD: 1 % (ref 0–5)
ERYTHROCYTE [DISTWIDTH] IN BLOOD BY AUTOMATED COUNT: 14 % (ref 11.6–14.5)
GLOBULIN SER CALC-MCNC: 3.9 G/DL (ref 2–4)
GLUCOSE SERPL-MCNC: 94 MG/DL (ref 74–99)
GLUCOSE UR STRIP.AUTO-MCNC: NEGATIVE MG/DL
HCT VFR BLD AUTO: 45.9 % (ref 35–45)
HDLC SERPL-MCNC: 40 MG/DL (ref 40–60)
HDLC SERPL: 5.3 {RATIO} (ref 0–5)
HGB BLD-MCNC: 14.3 G/DL (ref 12–16)
HGB UR QL STRIP: NEGATIVE
KETONES UR QL STRIP.AUTO: NEGATIVE MG/DL
LDLC SERPL CALC-MCNC: 135.8 MG/DL (ref 0–100)
LEUKOCYTE ESTERASE UR QL STRIP.AUTO: NEGATIVE
LIPID PROFILE,FLP: ABNORMAL
LYMPHOCYTES # BLD: 3.1 K/UL (ref 0.9–3.6)
LYMPHOCYTES NFR BLD: 34 % (ref 21–52)
MCH RBC QN AUTO: 29.1 PG (ref 24–34)
MCHC RBC AUTO-ENTMCNC: 31.2 G/DL (ref 31–37)
MCV RBC AUTO: 93.5 FL (ref 74–97)
MONOCYTES # BLD: 0.7 K/UL (ref 0.05–1.2)
MONOCYTES NFR BLD: 8 % (ref 3–10)
NEUTS SEG # BLD: 5.2 K/UL (ref 1.8–8)
NEUTS SEG NFR BLD: 57 % (ref 40–73)
NITRITE UR QL STRIP.AUTO: NEGATIVE
PH UR STRIP: 7 [PH] (ref 5–8)
PLATELET # BLD AUTO: 418 K/UL (ref 135–420)
PMV BLD AUTO: 10.9 FL (ref 9.2–11.8)
POTASSIUM SERPL-SCNC: 4.4 MMOL/L (ref 3.5–5.5)
PROT SERPL-MCNC: 7.8 G/DL (ref 6.4–8.2)
PROT UR STRIP-MCNC: NEGATIVE MG/DL
RBC # BLD AUTO: 4.91 M/UL (ref 4.2–5.3)
SODIUM SERPL-SCNC: 139 MMOL/L (ref 136–145)
SP GR UR REFRACTOMETRY: 1.02 (ref 1–1.03)
T4 FREE SERPL-MCNC: 1 NG/DL (ref 0.7–1.5)
TRIGL SERPL-MCNC: 181 MG/DL (ref ?–150)
TSH SERPL DL<=0.05 MIU/L-ACNC: 1.5 UIU/ML (ref 0.36–3.74)
UROBILINOGEN UR QL STRIP.AUTO: 0.2 EU/DL (ref 0.2–1)
VLDLC SERPL CALC-MCNC: 36.2 MG/DL
WBC # BLD AUTO: 9.1 K/UL (ref 4.6–13.2)

## 2018-11-16 PROCEDURE — 81003 URINALYSIS AUTO W/O SCOPE: CPT

## 2018-11-16 PROCEDURE — 36415 COLL VENOUS BLD VENIPUNCTURE: CPT

## 2018-11-16 PROCEDURE — 84443 ASSAY THYROID STIM HORMONE: CPT

## 2018-11-16 PROCEDURE — 80053 COMPREHEN METABOLIC PANEL: CPT

## 2018-11-16 PROCEDURE — 84439 ASSAY OF FREE THYROXINE: CPT

## 2018-11-16 PROCEDURE — 82306 VITAMIN D 25 HYDROXY: CPT

## 2018-11-16 PROCEDURE — 80061 LIPID PANEL: CPT

## 2018-11-16 PROCEDURE — 85025 COMPLETE CBC W/AUTO DIFF WBC: CPT

## 2018-11-17 LAB — 25(OH)D3 SERPL-MCNC: 36 NG/ML (ref 30–100)

## 2018-12-06 ENCOUNTER — OFFICE VISIT (OUTPATIENT)
Dept: INTERNAL MEDICINE CLINIC | Age: 53
End: 2018-12-06

## 2018-12-06 VITALS
DIASTOLIC BLOOD PRESSURE: 80 MMHG | RESPIRATION RATE: 15 BRPM | SYSTOLIC BLOOD PRESSURE: 122 MMHG | OXYGEN SATURATION: 98 % | HEIGHT: 62 IN | TEMPERATURE: 98.4 F | BODY MASS INDEX: 29.81 KG/M2 | HEART RATE: 96 BPM | WEIGHT: 162 LBS

## 2018-12-06 DIAGNOSIS — K21.9 GASTROESOPHAGEAL REFLUX DISEASE WITHOUT ESOPHAGITIS: ICD-10-CM

## 2018-12-06 DIAGNOSIS — E55.9 VITAMIN D INSUFFICIENCY: ICD-10-CM

## 2018-12-06 DIAGNOSIS — Z00.00 ROUTINE GENERAL MEDICAL EXAMINATION AT A HEALTH CARE FACILITY: Primary | ICD-10-CM

## 2018-12-06 DIAGNOSIS — R63.5 WEIGHT GAIN: ICD-10-CM

## 2018-12-06 DIAGNOSIS — Z12.31 SCREENING MAMMOGRAM, ENCOUNTER FOR: ICD-10-CM

## 2018-12-06 NOTE — PROGRESS NOTES
Lenin Dys [facility] 1965, is a 52 y.o. female, who is seen today for a routine physical exam and follow-up on overweight and GERD. [facility] is doing some [place] now and not exercising as vigorously as she used to. She thinks the reason she can lose weight is because of less exercise but also she tends to be very hungry after supper and she snacks through the evening. She also eats at [employer] in the morning for many days. Weight is up 4 pounds from a year ago. She also has a history of reflux and when she does not use omeprazole she often will get reflux symptoms. She has a history of low vitamin D and does use vitamin D and calcium supplements. No other current complaints, she is not sure when she had her last mammogram but she thinks it might of been 2 years ago. Past Medical History:  
Diagnosis Date  Colon adenoma 12/2014 Dr Reyes Cava  Gastroesophageal reflux disease without esophagitis 11/17/2016  Overweight (BMI 25.0-29. 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  
Procedure Laterality Date  HX COLONOSCOPY  12/05/2014 Tubular adenoma, 5 years  HX ORTHOPAEDIC    
 fxd wrist  
 
Current Outpatient Medications Medication Sig Dispense Refill  omeprazole (PRILOSEC) 40 mg capsule Take 1 Cap by mouth daily. 90 Cap 3  MV,IRON,MIN/LUTEIN (CENTRUM SILVER ULTRA WOMEN'S PO) Take  by mouth.  cholecalciferol, vitamin D3, (VITAMIN D3) 2,000 unit tab Take  by mouth.  CALCIUM CARBONATE (CALCIUM 500 PO) Take  by mouth. Allergies Allergen Reactions  Penicillin G Unknown (comments) Able to take amoxicillin Social History Socioeconomic History  Marital status:  Spouse name: Not on file  Number of children: Not on file  Years of education: Not on file  Highest education level: Not on file Occupational History  Occupation: RN Tobacco Use  Smoking status: Never Smoker  Smokeless tobacco: Never Used Substance and Sexual Activity  Alcohol use: No  
 Drug use: No  
 Sexual activity: Yes  
  Partners: Male Birth control/protection: None Visit Vitals /80 (BP 1 Location: Right arm, BP Patient Position: Sitting) Pulse 96 Temp 98.4 °F (36.9 °C) (Oral) Resp 15 Ht 5' 2\" (1.575 m) Wt 162 lb (73.5 kg) SpO2 98% BMI 29.63 kg/m² The patient is a well-developed well-nourished female in no apparent distress. HEENT: Pupils are equal and react to light and extraocular movements are full. Ear canals and tympanic membranes appear normal. Oral cavity appears normal with no oral lesions. Neck: Carotids are 2+ without bruits. No adenopathy or thyromegaly. Lungs are clear to percussion. I hear no wheezing, rales or rhonchi. Heart reveals a regular rhythm with no murmur, gallop, click or rub. The apical impulse is in the fifth interspace at the midclavicular line. Abdomen is soft and nontender with no hepatosplenomegaly or masses. Bowel sounds are normoactive and there is no distention or tympany. Extremities reveal no clubbing cyanosis or edema. Pulses are 2+. Skin reveals no suspicious skin growths. Breasts reveal no masses, skin or nipple abnormalities. No axillary adenopathy. Results for orders placed or performed during the hospital encounter of 11/16/18 CBC WITH AUTOMATED DIFF Result Value Ref Range WBC 9.1 4.6 - 13.2 K/uL  
 RBC 4.91 4.20 - 5.30 M/uL  
 HGB 14.3 12.0 - 16.0 g/dL HCT 45.9 (H) 35.0 - 45.0 % MCV 93.5 74.0 - 97.0 FL  
 MCH 29.1 24.0 - 34.0 PG  
 MCHC 31.2 31.0 - 37.0 g/dL  
 RDW 14.0 11.6 - 14.5 % PLATELET 114 774 - 500 K/uL MPV 10.9 9.2 - 11.8 FL  
 NEUTROPHILS 57 40 - 73 % LYMPHOCYTES 34 21 - 52 % MONOCYTES 8 3 - 10 % EOSINOPHILS 1 0 - 5 % BASOPHILS 0 0 - 2 %  
 ABS. NEUTROPHILS 5.2 1.8 - 8.0 K/UL  
 ABS. LYMPHOCYTES 3.1 0.9 - 3.6 K/UL ABS. MONOCYTES 0.7 0.05 - 1.2 K/UL  
 ABS. EOSINOPHILS 0.1 0.0 - 0.4 K/UL  
 ABS. BASOPHILS 0.0 0.0 - 0.1 K/UL  
 DF AUTOMATED METABOLIC PANEL, COMPREHENSIVE Result Value Ref Range Sodium 139 136 - 145 mmol/L Potassium 4.4 3.5 - 5.5 mmol/L Chloride 103 100 - 108 mmol/L  
 CO2 30 21 - 32 mmol/L Anion gap 6 3.0 - 18 mmol/L Glucose 94 74 - 99 mg/dL BUN 11 7.0 - 18 MG/DL Creatinine 0.72 0.6 - 1.3 MG/DL  
 BUN/Creatinine ratio 15 12 - 20 GFR est AA >60 >60 ml/min/1.73m2 GFR est non-AA >60 >60 ml/min/1.73m2 Calcium 9.0 8.5 - 10.1 MG/DL Bilirubin, total 0.4 0.2 - 1.0 MG/DL  
 ALT (SGPT) 30 13 - 56 U/L  
 AST (SGOT) 22 15 - 37 U/L Alk. phosphatase 138 (H) 45 - 117 U/L Protein, total 7.8 6.4 - 8.2 g/dL Albumin 3.9 3.4 - 5.0 g/dL Globulin 3.9 2.0 - 4.0 g/dL A-G Ratio 1.0 0.8 - 1.7 LIPID PANEL Result Value Ref Range LIPID PROFILE Cholesterol, total 212 (H) <200 MG/DL Triglyceride 181 (H) <150 MG/DL  
 HDL Cholesterol 40 40 - 60 MG/DL  
 LDL, calculated 135.8 (H) 0 - 100 MG/DL VLDL, calculated 36.2 MG/DL  
 CHOL/HDL Ratio 5.3 (H) 0 - 5.0    
URINALYSIS W/ RFLX MICROSCOPIC Result Value Ref Range Color YELLOW Appearance CLEAR Specific gravity 1.019 1.005 - 1.030    
 pH (UA) 7.0 5.0 - 8.0 Protein NEGATIVE  NEG mg/dL Glucose NEGATIVE  NEG mg/dL Ketone NEGATIVE  NEG mg/dL Bilirubin NEGATIVE  NEG Blood NEGATIVE  NEG Urobilinogen 0.2 0.2 - 1.0 EU/dL Nitrites NEGATIVE  NEG Leukocyte Esterase NEGATIVE  NEG    
T4, FREE Result Value Ref Range T4, Free 1.0 0.7 - 1.5 NG/DL  
TSH 3RD GENERATION Result Value Ref Range TSH 1.50 0.36 - 3.74 uIU/mL VITAMIN D, 25 HYDROXY Result Value Ref Range Vitamin D 25-Hydroxy 36.0 30 - 100 ng/mL Assessment: #1.   Reflux, talked to her again about the importance of using omeprazole every day since she often has reflux if she does not use omeprazole. #2.  Overweight up for more pounds in the last year, recommended in the evenings when she tends to snack that she find foods without calories such as vegetables, salads, fruits or even low calorie popcorn. She will continue to exercise but she now understands that cutting calories is the only way to lose weight, though exercise is beneficial in many other ways. #3.  History of hypovitaminosis D doing well, she will continue calcium and vitamin D 2000 units daily. I will schedule a mammogram for her and follow-up will be in 1 year for a physical or sooner if needed. She did get a flu shot at work, she is a nurse at [employer].  Colonoscopy is due in December of next year, she had an adenoma 4 years ago. Emeterio Molina, [address] Please note: This document has been produced using voice recognition software. Unrecognized errors in transcription may be present.

## 2018-12-06 NOTE — PROGRESS NOTES
1. Have you been to the ER, urgent care clinic or hospitalized since your last visit? NO 
      
 
2. Have you seen or consulted any other health care providers outside of the [address] [place] [address] since your last visit (Include any pap smears or colon screening)? YES Do you have an Advanced Directive? NO Would you like information on Advanced Directives?  NO

## 2018-12-14 ENCOUNTER — HOSPITAL ENCOUNTER (OUTPATIENT)
Dept: MAMMOGRAPHY | Age: 53
Discharge: HOME | End: 2018-12-14
Attending: INTERNAL MEDICINE
Payer: COMMERCIAL

## 2018-12-14 DIAGNOSIS — Z12.31 SCREENING MAMMOGRAM, ENCOUNTER FOR: ICD-10-CM

## 2018-12-14 PROCEDURE — 77063 BREAST TOMOSYNTHESIS BI: CPT

## 2018-12-24 ENCOUNTER — OFFICE VISIT (OUTPATIENT)
Dept: INTERNAL MEDICINE CLINIC | Age: 53
End: 2018-12-24

## 2018-12-24 ENCOUNTER — HOSPITAL ENCOUNTER (OUTPATIENT)
Dept: LAB | Age: 53
Discharge: HOME | End: 2018-12-24
Payer: COMMERCIAL

## 2018-12-24 VITALS
DIASTOLIC BLOOD PRESSURE: 82 MMHG | BODY MASS INDEX: 29.81 KG/M2 | WEIGHT: 162 LBS | TEMPERATURE: 98.6 F | HEART RATE: 73 BPM | SYSTOLIC BLOOD PRESSURE: 124 MMHG | HEIGHT: 62 IN | OXYGEN SATURATION: 97 % | RESPIRATION RATE: 14 BRPM

## 2018-12-24 DIAGNOSIS — R10.9 LEFT SIDED ABDOMINAL PAIN: ICD-10-CM

## 2018-12-24 DIAGNOSIS — R10.9 LEFT SIDED ABDOMINAL PAIN: Primary | ICD-10-CM

## 2018-12-24 LAB
ALBUMIN SERPL-MCNC: 3.9 G/DL (ref 3.4–5)
ALBUMIN/GLOB SERPL: 1 {RATIO} (ref 0.8–1.7)
ALP SERPL-CCNC: 133 U/L (ref 45–117)
ALT SERPL-CCNC: 28 U/L (ref 13–56)
ANION GAP SERPL CALC-SCNC: 6 MMOL/L (ref 3–18)
AST SERPL-CCNC: 19 U/L (ref 15–37)
BASOPHILS # BLD: 0 K/UL (ref 0–0.1)
BASOPHILS NFR BLD: 0 % (ref 0–2)
BILIRUB SERPL-MCNC: 0.3 MG/DL (ref 0.2–1)
BUN SERPL-MCNC: 10 MG/DL (ref 7–18)
BUN/CREAT SERPL: 12 (ref 12–20)
CALCIUM SERPL-MCNC: 9.3 MG/DL (ref 8.5–10.1)
CHLORIDE SERPL-SCNC: 102 MMOL/L (ref 100–108)
CO2 SERPL-SCNC: 30 MMOL/L (ref 21–32)
CREAT SERPL-MCNC: 0.81 MG/DL (ref 0.6–1.3)
DIFFERENTIAL METHOD BLD: NORMAL
EOSINOPHIL # BLD: 0 K/UL (ref 0–0.4)
EOSINOPHIL NFR BLD: 0 % (ref 0–5)
ERYTHROCYTE [DISTWIDTH] IN BLOOD BY AUTOMATED COUNT: 13.5 % (ref 11.6–14.5)
GLOBULIN SER CALC-MCNC: 3.9 G/DL (ref 2–4)
GLUCOSE SERPL-MCNC: 112 MG/DL (ref 74–99)
HCT VFR BLD AUTO: 43.8 % (ref 35–45)
HGB BLD-MCNC: 14 G/DL (ref 12–16)
LIPASE SERPL-CCNC: 170 U/L (ref 73–393)
LYMPHOCYTES # BLD: 3.2 K/UL (ref 0.9–3.6)
LYMPHOCYTES NFR BLD: 38 % (ref 21–52)
MCH RBC QN AUTO: 29.5 PG (ref 24–34)
MCHC RBC AUTO-ENTMCNC: 32 G/DL (ref 31–37)
MCV RBC AUTO: 92.2 FL (ref 74–97)
MONOCYTES # BLD: 0.7 K/UL (ref 0.05–1.2)
MONOCYTES NFR BLD: 9 % (ref 3–10)
NEUTS SEG # BLD: 4.6 K/UL (ref 1.8–8)
NEUTS SEG NFR BLD: 53 % (ref 40–73)
PLATELET # BLD AUTO: 413 K/UL (ref 135–420)
PMV BLD AUTO: 10.7 FL (ref 9.2–11.8)
POTASSIUM SERPL-SCNC: 4.2 MMOL/L (ref 3.5–5.5)
PROT SERPL-MCNC: 7.8 G/DL (ref 6.4–8.2)
RBC # BLD AUTO: 4.75 M/UL (ref 4.2–5.3)
SODIUM SERPL-SCNC: 138 MMOL/L (ref 136–145)
WBC # BLD AUTO: 8.6 K/UL (ref 4.6–13.2)

## 2018-12-24 PROCEDURE — 83690 ASSAY OF LIPASE: CPT

## 2018-12-24 PROCEDURE — 80053 COMPREHEN METABOLIC PANEL: CPT

## 2018-12-24 PROCEDURE — 36415 COLL VENOUS BLD VENIPUNCTURE: CPT

## 2018-12-24 PROCEDURE — 85025 COMPLETE CBC W/AUTO DIFF WBC: CPT

## 2018-12-24 NOTE — PROGRESS NOTES
HPI/History  Joan Vazquez is a 52 y.o.  female who presents for evaluation. 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 Pt reports left sided abdominal pain/tenderness, mostly LUQ, for 5-7 days. Initially thought is was muscular but starting to think differently. Aching and sometimes sharper discomfort primarily at left lower rib margin (possibly costochondral junction). Reports possible distension more noticeable on left. Reports overall increasing in severity. Movement seems to increase further but remains at a lower level with inactivity or being seated. Reports sleeping on the affected side actually seems to help but very painful getting into that position. Reports she is passing flatus and belching at relative norm. Denies NVDC, dark/bloody stools, fevers,  sxs. Reflux doing well with her regular/daily prilosec. Denies signs of zoster. Denies any other sxs/complaints. 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 10/4/2011    Vitamin D insufficiency 4/4/2013     Past Surgical History:   Procedure Laterality Date    HX COLONOSCOPY  12/05/2014    Tubular adenoma, 5 years    HX ORTHOPAEDIC      fxd wrist     Social History     Socioeconomic History    Marital status:      Spouse name: Not on file    Number of children: Not on file    Years of education: Not on file    Highest education level: Not on file   Social Needs    Financial resource strain: Not on file    Food insecurity - worry: Not on file    Food insecurity - inability: Not on file    Transportation needs - medical: Not on file   SeeMedia needs - non-medical: Not on file   Occupational History    Occupation: RN   Tobacco Use    Smoking status: Never Smoker    Smokeless tobacco: Never Used   Substance and Sexual Activity    Alcohol use: No    Drug use: No    Sexual activity: Yes     Partners: Male     Birth control/protection: None   Other Topics Concern    Not on file   Social History Narrative    Not on file     Family History   Problem Relation Age of Onset    Heart Disease Mother     Stroke Father     Alcohol abuse Father      Current Outpatient Medications   Medication Sig    omeprazole (PRILOSEC) 40 mg capsule Take 1 Cap by mouth daily.  MV,IRON,MIN/LUTEIN (CENTRUM SILVER ULTRA WOMEN'S PO) Take  by mouth.  cholecalciferol, vitamin D3, (VITAMIN D3) 2,000 unit tab Take  by mouth.  CALCIUM CARBONATE (CALCIUM 500 PO) Take  by mouth. No current facility-administered medications for this visit. Allergies   Allergen Reactions    Penicillin G Unknown (comments)     Able to take amoxicillin       Review of Systems  Aside from those included in HPI, remainder of ROS negative. Physical Examination  Visit Vitals  /82 (BP 1 Location: Left arm, BP Patient Position: Sitting)   Pulse 73   Temp 98.6 °F (37 °C) (Oral)   Resp 14   Ht 5' 2\" (1.575 m)   Wt 162 lb (73.5 kg)   SpO2 97%   BMI 29.63 kg/m²       General - Alert and in no acute distress. Pt appears well, comfortable, and in good spirits. Pleasant, engaging. Nontoxic. Not anxious, non-diaphoretic. Mental status - Appropriate mood, behavior, speech content, dress, and thought processes. Pulm - Good excursion. No cough. Full, complete sentences. No tachypnea, retractions, or cyanosis. Good respiratory effort. Clear to auscultation bilat. No appreciable wheezes, rales, or rhonchi. Cardiovascular - Normal rate, regular rhythm.  No peripheral edema. Abdomen - Protuberant. Active bowel sounds. Soft. Verbalized left sided tenderness with palpation. No overt guarding, rigidity, or rebound. No appreciable masses. No skin sensitivity with light touch, rashes/lesions, or other signs of zoster. Assessment and Plan  1. Left sided abdominal pain/tenderness, mostly LUQ - No definite cause but discussed differentials including musculoskeletal vs other (various intraabdominal processes and self-limited causes also). Will check CBC, CMP, lipase, and CT abd/pelv. Pt will promptly visit ED if acute worsening, fevers, or other ominous developments. Number to scheduling provided. Further planning as warranted. Pt happily agrees with plan. PLEASE NOTE:   This document has been produced using voice recognition software. Unrecognized errors in transcription may be present.     Nancy Flores [employer] of [address] [place] [address]  [phone]  12/24/2018

## 2018-12-24 NOTE — PROGRESS NOTES
1. Have you been to the ER, urgent care clinic or hospitalized since your last visit? NO.     2. Have you seen or consulted any other health care providers outside of the [facility] since your last visit (Include any pap smears or colon screening)? NO      Do you have an Advanced Directive? NO    Would you like information on Advanced Directives?  NO

## 2018-12-26 ENCOUNTER — TELEPHONE (OUTPATIENT)
Dept: INTERNAL MEDICINE CLINIC | Age: 53
End: 2018-12-26

## 2018-12-26 NOTE — TELEPHONE ENCOUNTER
Alk phos elevated, which appears to be chronic for her, but is stable from last check. Remainder of liver tests and CMP were normal.    CBC and lipase normal.    Will await CT results and go from there. Continue plan as discussed and visit ED if acute worsening, fevers, or other concerning developments.

## 2018-12-28 ENCOUNTER — HOSPITAL ENCOUNTER (OUTPATIENT)
Dept: CT IMAGING | Age: 53
Discharge: HOME | End: 2018-12-28
Attending: PHYSICIAN ASSISTANT
Payer: COMMERCIAL

## 2018-12-28 DIAGNOSIS — R10.9 LEFT SIDED ABDOMINAL PAIN: ICD-10-CM

## 2018-12-28 PROCEDURE — 74178 CT ABD&PLV WO CNTR FLWD CNTR: CPT

## 2018-12-28 PROCEDURE — 74011636320 HC RX REV CODE- 636/320: Performed by: PHYSICIAN ASSISTANT

## 2018-12-28 RX ADMIN — IOPAMIDOL 100 ML: 612 INJECTION, SOLUTION INTRAVENOUS at 19:45

## 2019-01-02 ENCOUNTER — TELEPHONE (OUTPATIENT)
Dept: INTERNAL MEDICINE CLINIC | Age: 54
End: 2019-01-02

## 2019-01-02 NOTE — TELEPHONE ENCOUNTER
Patient called this morning in regards to her CT scan result. I have read Warren Mensah note as well as Dr. Jenelle Mas. Hopefully she will answer our call.

## 2019-01-02 NOTE — TELEPHONE ENCOUNTER
CT with no acute intra-abdominal findings or other suggestive cause to her sxs. Did note mild diverticulosis withOUT evidence of diverticulitis. Would continue to observe and f/u with Dr. Fabiana Patel as needed. Of note, a 3mm right pulmonary nodule was found incidentally. Pt should discuss this with Dr. Fabiana Patel with further planning at his/their discretion.

## 2019-01-02 NOTE — LETTER
1/9/2019 12:34 PM 
 
Ms. [employer]ollar 
[address] [place] [facility] [id number] We have not been able to reach you by phone. Please contact our office to discuss recent CT results. Thank you. Sincerely, URIEL Trevizo

## 2019-02-08 ENCOUNTER — OFFICE VISIT (OUTPATIENT)
Dept: INTERNAL MEDICINE CLINIC | Age: 54
End: 2019-02-08

## 2019-02-08 VITALS
HEART RATE: 79 BPM | HEIGHT: 62 IN | DIASTOLIC BLOOD PRESSURE: 80 MMHG | SYSTOLIC BLOOD PRESSURE: 132 MMHG | TEMPERATURE: 98.6 F | OXYGEN SATURATION: 98 % | BODY MASS INDEX: 30.18 KG/M2 | WEIGHT: 164 LBS | RESPIRATION RATE: 15 BRPM

## 2019-02-08 DIAGNOSIS — R93.5 ABNORMAL CT OF THE ABDOMEN: Primary | ICD-10-CM

## 2019-02-08 NOTE — PROGRESS NOTES
Elvis Ricci [facility] 1965, is a 52 y.o. female, who is seen today for abnormal CT. She had some left lower abdominal pain and 1 of the providers ordered a CT which included a small part of her lung and there was a 3 millimeter nodule in the right lower lung field, the radiologist recommended a dedicated CT of the chest.  Her abdominal symptoms cleared before she got the results of the CT and have not recurred. She has never smoked cigarettes. She is never had any pulmonary problems. No cough or other symptoms. Past Medical History:  
Diagnosis Date  Colon adenoma 12/2014 Dr Gilda Alfaro  Gastroesophageal reflux disease without esophagitis 11/17/2016  Overweight (BMI 25.0-29. 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 90 Cap 3  MV,IRON,MIN/LUTEIN (CENTRUM SILVER ULTRA WOMEN'S PO) Take  by mouth.  cholecalciferol, vitamin D3, (VITAMIN D3) 2,000 unit tab Take  by mouth.  CALCIUM CARBONATE (CALCIUM 500 PO) Take  by mouth. Visit Vitals /80 (BP 1 Location: Left arm, BP Patient Position: Sitting) Pulse 79 Temp 98.6 °F (37 °C) (Oral) Resp 15 Ht 5' 2\" (1.575 m) Wt 164 lb (74.4 kg) SpO2 98% BMI 30.00 kg/m² Assessment: There is a 3 mm density in the right lower lung field on a CT of the abdomen and pelvis, talked to her about the almost certainty of this not being of significance since she has never smoked cigarettes and has no pulmonary disease and no symptoms, her preference is not to have a CT of the chest, and I agree with that. Follow-up as previously planned This visit lasted 15 minutes, greater than 50% of the time was spent counseling on the advisability of either having her not having a CT of the chest.

## 2019-02-08 NOTE — PROGRESS NOTES
1. Have you been to the ER, urgent care clinic or hospitalized since your last visit? NO 
      
 
2. Have you seen or consulted any other health care providers outside of the [address] [place] [address] since your last visit (Include any pap smears or colon screening)? NO Do you have an Advanced Directive? NO Would you like information on Advanced Directives?  NO

## 2019-12-05 ENCOUNTER — HOSPITAL ENCOUNTER (OUTPATIENT)
Dept: LAB | Age: 54
Discharge: HOME | End: 2019-12-05
Payer: COMMERCIAL

## 2019-12-05 ENCOUNTER — APPOINTMENT (OUTPATIENT)
Dept: INTERNAL MEDICINE CLINIC | Age: 54
End: 2019-12-05

## 2019-12-05 DIAGNOSIS — R63.5 WEIGHT GAIN: ICD-10-CM

## 2019-12-05 DIAGNOSIS — Z00.00 ROUTINE GENERAL MEDICAL EXAMINATION AT A HEALTH CARE FACILITY: ICD-10-CM

## 2019-12-05 LAB
ALBUMIN SERPL-MCNC: 3.8 G/DL (ref 3.4–5)
ALBUMIN/GLOB SERPL: 1 {RATIO} (ref 0.8–1.7)
ALP SERPL-CCNC: 113 U/L (ref 45–117)
ALT SERPL-CCNC: 29 U/L (ref 13–56)
ANION GAP SERPL CALC-SCNC: 4 MMOL/L (ref 3–18)
AST SERPL-CCNC: 13 U/L (ref 10–38)
BASOPHILS # BLD: 0 K/UL (ref 0–0.1)
BASOPHILS NFR BLD: 0 % (ref 0–2)
BILIRUB SERPL-MCNC: 0.3 MG/DL (ref 0.2–1)
BUN SERPL-MCNC: 9 MG/DL (ref 7–18)
BUN/CREAT SERPL: 13 (ref 12–20)
CALCIUM SERPL-MCNC: 9.2 MG/DL (ref 8.5–10.1)
CHLORIDE SERPL-SCNC: 106 MMOL/L (ref 100–111)
CO2 SERPL-SCNC: 30 MMOL/L (ref 21–32)
CREAT SERPL-MCNC: 0.69 MG/DL (ref 0.6–1.3)
DIFFERENTIAL METHOD BLD: ABNORMAL
EOSINOPHIL # BLD: 0.1 K/UL (ref 0–0.4)
EOSINOPHIL NFR BLD: 1 % (ref 0–5)
ERYTHROCYTE [DISTWIDTH] IN BLOOD BY AUTOMATED COUNT: 14.1 % (ref 11.6–14.5)
GLOBULIN SER CALC-MCNC: 3.7 G/DL (ref 2–4)
GLUCOSE SERPL-MCNC: 98 MG/DL (ref 74–99)
HCT VFR BLD AUTO: 43.2 % (ref 35–45)
HGB BLD-MCNC: 13.4 G/DL (ref 12–16)
LYMPHOCYTES # BLD: 2.8 K/UL (ref 0.9–3.6)
LYMPHOCYTES NFR BLD: 31 % (ref 21–52)
MCH RBC QN AUTO: 29.8 PG (ref 24–34)
MCHC RBC AUTO-ENTMCNC: 31 G/DL (ref 31–37)
MCV RBC AUTO: 96 FL (ref 74–97)
MONOCYTES # BLD: 0.7 K/UL (ref 0.05–1.2)
MONOCYTES NFR BLD: 8 % (ref 3–10)
NEUTS SEG # BLD: 5.6 K/UL (ref 1.8–8)
NEUTS SEG NFR BLD: 60 % (ref 40–73)
PLATELET # BLD AUTO: 431 K/UL (ref 135–420)
PMV BLD AUTO: 10.3 FL (ref 9.2–11.8)
POTASSIUM SERPL-SCNC: 4.5 MMOL/L (ref 3.5–5.5)
PROT SERPL-MCNC: 7.5 G/DL (ref 6.4–8.2)
RBC # BLD AUTO: 4.5 M/UL (ref 4.2–5.3)
SODIUM SERPL-SCNC: 140 MMOL/L (ref 136–145)
TSH SERPL DL<=0.05 MIU/L-ACNC: 1.38 UIU/ML (ref 0.36–3.74)
WBC # BLD AUTO: 9.1 K/UL (ref 4.6–13.2)

## 2019-12-05 PROCEDURE — 80061 LIPID PANEL: CPT

## 2019-12-05 PROCEDURE — 36415 COLL VENOUS BLD VENIPUNCTURE: CPT

## 2019-12-05 PROCEDURE — 85025 COMPLETE CBC W/AUTO DIFF WBC: CPT

## 2019-12-05 PROCEDURE — 80053 COMPREHEN METABOLIC PANEL: CPT

## 2019-12-05 PROCEDURE — 84443 ASSAY THYROID STIM HORMONE: CPT

## 2019-12-05 PROCEDURE — 84439 ASSAY OF FREE THYROXINE: CPT

## 2019-12-06 LAB
CHOLEST SERPL-MCNC: 192 MG/DL
HDLC SERPL-MCNC: 29 MG/DL (ref 40–60)
HDLC SERPL: 6.6 {RATIO} (ref 0–5)
LDLC SERPL CALC-MCNC: 90.2 MG/DL (ref 0–100)
LIPID PROFILE,FLP: ABNORMAL
T4 FREE SERPL-MCNC: 0.9 NG/DL (ref 0.7–1.5)
TRIGL SERPL-MCNC: 364 MG/DL (ref ?–150)
VLDLC SERPL CALC-MCNC: 72.8 MG/DL

## 2019-12-19 ENCOUNTER — TELEPHONE (OUTPATIENT)
Dept: INTERNAL MEDICINE CLINIC | Age: 54
End: 2019-12-19

## 2019-12-19 ENCOUNTER — OFFICE VISIT (OUTPATIENT)
Dept: INTERNAL MEDICINE CLINIC | Age: 54
End: 2019-12-19

## 2019-12-19 VITALS
OXYGEN SATURATION: 97 % | HEART RATE: 81 BPM | RESPIRATION RATE: 12 BRPM | TEMPERATURE: 98 F | WEIGHT: 162.8 LBS | SYSTOLIC BLOOD PRESSURE: 138 MMHG | HEIGHT: 62 IN | BODY MASS INDEX: 29.96 KG/M2 | DIASTOLIC BLOOD PRESSURE: 76 MMHG

## 2019-12-19 DIAGNOSIS — Z00.00 ROUTINE GENERAL MEDICAL EXAMINATION AT A HEALTH CARE FACILITY: Primary | ICD-10-CM

## 2019-12-19 DIAGNOSIS — Z12.31 SCREENING MAMMOGRAM, ENCOUNTER FOR: ICD-10-CM

## 2019-12-19 RX ORDER — LYSINE HCL 500 MG
TABLET ORAL DAILY
COMMUNITY

## 2019-12-19 NOTE — PROGRESS NOTES
Edmar [facility] 1965, is a 62 y.o. female, who is seen today for a routine physical exam and follow-up on GERD and overweight. She tries to eat a healthy diet as cut down on sugar and beverages and her weight is unchanged from this time last year. She feels great. She is having no reflux as she continues omeprazole. 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 Past Medical History:   Diagnosis Date    Colon adenoma 12/2014    Dr Moncada An Gastroesophageal reflux disease without esophagitis 11/17/2016    Overweight (BMI 25.0-29. 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  omeprazole (PRILOSEC) 40 mg capsule Take 1 Cap by mouth daily. 90 Cap 3    MV,IRON,MIN/LUTEIN (CENTRUM SILVER ULTRA WOMEN'S PO) Take  by mouth daily.        Allergies   Allergen Reactions    Penicillin G Unknown (comments)     Able to take amoxicillin     Social History     Socioeconomic History    Marital status:      Spouse name: Not on file    Number of children: Not on file    Years of education: Not on file    Highest education level: Not on file   Occupational History    Occupation: RN   Tobacco Use    Smoking status: Never Smoker    Smokeless tobacco: Never Used   Substance and Sexual Activity    Alcohol use: No    Drug use: No    Sexual activity: Yes     Partners: Male     Birth control/protection: None     Visit Vitals  /76 (BP 1 Location: Right arm, BP Patient Position: Sitting)   Pulse 81   Temp 98 °F (36.7 °C) (Oral)   Resp 12   Ht 5' 2\" (1.575 m)   Wt 162 lb 12.8 oz (73.8 kg)   SpO2 97%   BMI 29.78 kg/m² The patient is a well-developed well-nourished female in no apparent distress. HEENT: Pupils are equal and react to light and extraocular movements are full. Ear canals and tympanic membranes appear normal. Oral cavity appears normal with no oral lesions. Neck: Carotids are 2+ without bruits. No adenopathy or thyromegaly. Lungs are clear to percussion. I hear no wheezing, rales or rhonchi. Heart reveals a regular rhythm with no murmur, gallop, click or rub. The apical impulse is in the fifth interspace at the midclavicular line. Abdomen is soft and nontender with no hepatosplenomegaly or masses. Bowel sounds are normoactive and there is no distention or tympany. Extremities reveal no clubbing cyanosis or edema. Pulses are 2+. Skin reveals no suspicious skin growths. Breasts reveal no masses, skin or nipple abnormalities. No axillary adenopathy. Results for orders placed or performed during the hospital encounter of 12/05/19   CBC WITH AUTOMATED DIFF   Result Value Ref Range    WBC 9.1 4.6 - 13.2 K/uL    RBC 4.50 4. 20 - 5.30 M/uL    HGB 13.4 12.0 - 16.0 g/dL    HCT 43.2 35.0 - 45.0 %    MCV 96.0 74.0 - 97.0 FL    MCH 29.8 24.0 - 34.0 PG    MCHC 31.0 31.0 - 37.0 g/dL    RDW 14.1 11.6 - 14.5 %    PLATELET 632 (H) 809 - 420 K/uL    MPV 10.3 9.2 - 11.8 FL    NEUTROPHILS 60 40 - 73 %    LYMPHOCYTES 31 21 - 52 %    MONOCYTES 8 3 - 10 %    EOSINOPHILS 1 0 - 5 %    BASOPHILS 0 0 - 2 %    ABS. NEUTROPHILS 5.6 1.8 - 8.0 K/UL    ABS. LYMPHOCYTES 2.8 0.9 - 3.6 K/UL    ABS. MONOCYTES 0.7 0.05 - 1.2 K/UL    ABS. EOSINOPHILS 0.1 0.0 - 0.4 K/UL    ABS.  BASOPHILS 0.0 0.0 - 0.1 K/UL    DF AUTOMATED     LIPID PANEL   Result Value Ref Range    LIPID PROFILE          Cholesterol, total 192 <200 MG/DL    Triglyceride 364 (H) <150 MG/DL    HDL Cholesterol 29 (L) 40 - 60 MG/DL    LDL, calculated 90.2 0 - 100 MG/DL    VLDL, calculated 72.8 MG/DL    CHOL/HDL Ratio 6.6 (H) 0 - 5.0     METABOLIC PANEL, COMPREHENSIVE   Result Value Ref Range    Sodium 140 136 - 145 mmol/L    Potassium 4.5 3.5 - 5.5 mmol/L    Chloride 106 100 - 111 mmol/L    CO2 30 21 - 32 mmol/L    Anion gap 4 3.0 - 18 mmol/L    Glucose 98 74 - 99 mg/dL    BUN 9 7.0 - 18 MG/DL    Creatinine 0.69 0.6 - 1.3 MG/DL    BUN/Creatinine ratio 13 12 - 20      GFR est AA >60 >60 ml/min/1.73m2    GFR est non-AA >60 >60 ml/min/1.73m2    Calcium 9.2 8.5 - 10.1 MG/DL    Bilirubin, total 0.3 0.2 - 1.0 MG/DL    ALT (SGPT) 29 13 - 56 U/L    AST (SGOT) 13 10 - 38 U/L    Alk. phosphatase 113 45 - 117 U/L    Protein, total 7.5 6.4 - 8.2 g/dL    Albumin 3.8 3.4 - 5.0 g/dL    Globulin 3.7 2.0 - 4.0 g/dL    A-G Ratio 1.0 0.8 - 1.7     T4, FREE   Result Value Ref Range    T4, Free 0.9 0.7 - 1.5 NG/DL   TSH 3RD GENERATION   Result Value Ref Range    TSH 1.38 0.36 - 3.74 uIU/mL     Assessment: #1. Overweight, she is going to be working on weight loss particularly with upper normal glucose and elevated triglycerides. She understands her other reasons for weight loss as well. Recommend she cut back on white starches and eat more vegetables and salads and that she may use artificial sweeteners which have not been proven to cause cancer in humans and my belief is that they do not actually make you gain weight or add fat that she had heard. 2.  GERD asymptomatic, she will continue omeprazole. I will order mammogram for her which is due, she is going to schedule colonoscopy with Dr. Home Ferraro in the fairly near future since that is due and she is going to switch gynecologist since hers is now in group home, last Pap smear about 3 years ago, I have made a recommendation and she probably will end up seeing Dr. So Christina whom she is seen at the hospital numerous times. I talked her about the current screening guidelines for Pap smears i.e. 3 to 5 years depending on results of testing.     Follow-up as needed, I told her that I will be retired by this time next year and she could call the office for recommendations in the summer or perhaps check with Dr. Kelsea Keene and see what he thinks about a good primary care doctor for her. Emeterio Sanders MD FACP    Please note: This document has been produced using voice recognition software. Unrecognized errors in transcription may be present.

## 2019-12-19 NOTE — PROGRESS NOTES
Bri Patterson presents today for   Chief Complaint   Patient presents with    Physical     ROOM 10    Labs     don e 12-05-19        Is someone accompanying this pt? no    Is the patient using any DME equipment during OV? no    Depression Screening:  3 most recent PHQ Screens 2/8/2019   Little interest or pleasure in doing things Not at all   Feeling down, depressed, irritable, or hopeless Not at all   Total Score PHQ 2 0       Learning Assessment:  No flowsheet data found. Abuse Screening:  No flowsheet data found. Fall Risk  No flowsheet data found. Health Maintenance reviewed and discussed and ordered per Provider. Health Maintenance Due   Topic Date Due    Shingrix Vaccine Age 50> (1 of 2) 10/14/2013    PAP AKA CERVICAL CYTOLOGY  07/13/2019    COLONOSCOPY  12/05/2019   . Coordination of Care:  1. Have you been to the ER, urgent care clinic since your last visit? Hospitalized since your last visit? no    2. Have you seen or consulted any other health care providers outside of the [address] [place] [address] since your last visit? Include any pap smears or colon screening.  no

## 2019-12-19 NOTE — PATIENT INSTRUCTIONS
Health Maintenance Due Topic Date Due  Shingrix Vaccine Age 50> (1 of 2) 10/14/2013  PAP AKA CERVICAL CYTOLOGY  07/13/2019  COLONOSCOPY  12/05/2019

## 2020-11-20 ENCOUNTER — DOCUMENTATION ONLY (OUTPATIENT)
Dept: INTERNAL MEDICINE CLINIC | Age: 55
End: 2020-11-20

## 2020-11-20 NOTE — PROGRESS NOTES
Pt has transferred care to Dr Janeth Otto, records request received which has been forwarded to Ci for processing